# Patient Record
Sex: FEMALE | URBAN - METROPOLITAN AREA
[De-identification: names, ages, dates, MRNs, and addresses within clinical notes are randomized per-mention and may not be internally consistent; named-entity substitution may affect disease eponyms.]

---

## 2022-09-27 ENCOUNTER — APPOINTMENT (OUTPATIENT)
Dept: URBAN - METROPOLITAN AREA CLINIC 274 | Age: 37
Setting detail: DERMATOLOGY
End: 2022-09-29

## 2022-09-27 DIAGNOSIS — L24 IRRITANT CONTACT DERMATITIS: ICD-10-CM

## 2022-09-27 DIAGNOSIS — B07.8 OTHER VIRAL WARTS: ICD-10-CM

## 2022-09-27 DIAGNOSIS — Z33.1 PREGNANT STATE, INCIDENTAL: ICD-10-CM

## 2022-09-27 PROBLEM — L24.9 IRRITANT CONTACT DERMATITIS, UNSPECIFIED CAUSE: Status: ACTIVE | Noted: 2022-09-27

## 2022-09-27 PROCEDURE — OTHER LIQUID NITROGEN: OTHER

## 2022-09-27 PROCEDURE — OTHER MEDICATION COUNSELING: OTHER

## 2022-09-27 PROCEDURE — 17110 DESTRUCT B9 LESION 1-14: CPT

## 2022-09-27 PROCEDURE — OTHER PHOTO-DOCUMENTATION: OTHER

## 2022-09-27 PROCEDURE — OTHER PRESCRIPTION: OTHER

## 2022-09-27 PROCEDURE — OTHER OTHER: OTHER

## 2022-09-27 PROCEDURE — OTHER COUNSELING: OTHER

## 2022-09-27 PROCEDURE — OTHER MIPS QUALITY: OTHER

## 2022-09-27 PROCEDURE — OTHER CANTHARIDIN: OTHER

## 2022-09-27 PROCEDURE — 99203 OFFICE O/P NEW LOW 30 MIN: CPT | Mod: 25

## 2022-09-27 RX ORDER — MOMETASONE FUROATE 1 MG/G
CREAM TOPICAL
Qty: 15 | Refills: 1 | Status: ERX | COMMUNITY
Start: 2022-09-27

## 2022-09-27 ASSESSMENT — LOCATION DETAILED DESCRIPTION DERM
LOCATION DETAILED: RIGHT DISTAL ULNAR THUMB
LOCATION DETAILED: LEFT DISTAL RADIAL THUMB
LOCATION DETAILED: RIGHT MEDIAL BUCCAL CHEEK
LOCATION DETAILED: LEFT DISTAL PALMAR INDEX FINGER

## 2022-09-27 ASSESSMENT — LOCATION ZONE DERM
LOCATION ZONE: FACE
LOCATION ZONE: FINGER

## 2022-09-27 ASSESSMENT — SEVERITY ASSESSMENT 2020: SEVERITY 2020: MILD

## 2022-09-27 ASSESSMENT — LOCATION SIMPLE DESCRIPTION DERM
LOCATION SIMPLE: RIGHT THUMB
LOCATION SIMPLE: RIGHT CHEEK
LOCATION SIMPLE: LEFT THUMB
LOCATION SIMPLE: LEFT INDEX FINGER

## 2022-09-27 ASSESSMENT — ITCH NUMERIC RATING SCALE: HOW SEVERE IS YOUR ITCHING?: 3

## 2022-09-27 ASSESSMENT — BSA RASH: BSA RASH: 1

## 2022-09-27 ASSESSMENT — PAIN INTENSITY VAS: HOW INTENSE IS YOUR PAIN 0 BEING NO PAIN, 10 BEING THE MOST SEVERE PAIN POSSIBLE?: NO PAIN

## 2022-09-27 NOTE — HPI: RASH
What Type Of Note Output Would You Prefer (Optional)?: Bullet Format
How Severe Is Your Rash?: mild
Is This A New Presentation, Or A Follow-Up?: Rash
Additional History: Pt states “ I am currently using hydrocortisone with Vaseline and some moisturizer which helps a little bit with the itch but that is about it.”

## 2022-09-27 NOTE — PROCEDURE: MEDICATION COUNSELING
Xeldaydayz Pregnancy And Lactation Text: This medication is Pregnancy Category D and is not considered safe during pregnancy.  The risk during breast feeding is also uncertain.

## 2022-09-27 NOTE — PROCEDURE: OTHER
Render Risk Assessment In Note?: no
Note Text (......Xxx Chief Complaint.): This diagnosis correlates with the
Detail Level: Zone
Other (Free Text): Pt is 22 weeks pregnant.

## 2022-09-27 NOTE — HPI: SKIN LESIONS
How Severe Is Your Skin Lesion?: mild
Have Your Skin Lesions Been Treated?: not been treated
Is This A New Presentation, Or A Follow-Up?: Skin Lesions
Additional History: Pt states “I have tried some over the counter stuff but it hasn’t really helped it.”
Which Family Member (Optional)?: Mother, grandmother, and cousin (all on mom’s side)
Which Family Member (Optional)?: Mother

## 2022-09-27 NOTE — PROCEDURE: CANTHARIDIN
Cantharone Forte Duration Text (Please Remove Duration From Postcare): The patient was instructed to leave the Cantharone Forte on for 6-8 hours and then wash the area well with soap and water.
Consent: The patient's consent was obtained including but not limited to risks of crusting, scabbing, scarring, blistering, darker or lighter pigmentary change, recurrence, incomplete removal and infection.
Include Z78.9 (Other Specified Conditions Influencing Health Status) As An Associated Diagnosis?: No
Medical Necessity Clause: This procedure was medically necessary because the lesions that were treated were:
Canthacur Duration Text (Please Remove Duration From Postcare): The patient was instructed to leave the Canthacur on for 6-8 hours and then wash the area well with soap and water.
Strength: Flori
Medical Necessity Information: It is in your best interest to select a reason for this procedure from the list below. All of these items fulfill various CMS LCD requirements except the new and changing color options.
Canthacur Ps Duration Text (Please Remove Duration From Postcare): The patient was instructed to leave the Canthacur PS on for 6-8 hours and then wash the area well with soap and water.
Detail Level: Detailed
Curette Text: Prior to application of cantharidin the lesions were lightly pared with a curette.
Post-Care Instructions: I reviewed with the patient in detail post-care instructions. The patient understands that the treated areas should be washed off 6 to 8 hours after application.
Cantharone Plus Duration Text (Please Remove Duration From Postcare): The patient was instructed to leave the Cantharone Plus on for 6-8 hours and then wash the area well with soap and water.
Cantharone Duration Text (Please Remove Duration From Postcare): The patient was instructed to leave the Cantharone on for 6-8 hours and then wash the area well with soap and water.

## 2022-09-27 NOTE — PROCEDURE: LIQUID NITROGEN
Consent: The patient's consent was obtained including but not limited to risks of crusting, scabbing, blistering, scarring, darker or lighter pigmentary change, recurrence, incomplete removal and infection.
Render Note In Bullet Format When Appropriate: No
Medical Necessity Clause: This procedure was medically necessary because the lesions that were treated were:
Show Spray Paint Technique Variable?: Yes
Detail Level: Detailed
Application Tool (Optional): Liquid Nitrogen Sprayer
Post-Care Instructions: I reviewed with the patient in detail post-care instructions. Patient is to wear sunprotection, and avoid picking at any of the treated lesions. Pt may apply Vaseline to crusted or scabbing areas.
Medical Necessity Information: It is in your best interest to select a reason for this procedure from the list below. All of these items fulfill various CMS LCD requirements except the new and changing color options.
Aperture Size (Optional): C
Spray Paint Text: The liquid nitrogen was applied to the skin utilizing a spray paint frosting technique.

## 2024-09-09 NOTE — PROCEDURE: MEDICATION COUNSELING
Received fax request from [employer] for a 90 day supply of LinPrime Advantages. Script was sent electronically.    Cyclophosphamide Counseling:  I discussed with the patient the risks of cyclophosphamide including but not limited to hair loss, hormonal abnormalities, decreased fertility, abdominal pain, diarrhea, nausea and vomiting, bone marrow suppression and infection. The patient understands that monitoring is required while taking this medication.